# Patient Record
Sex: MALE
[De-identification: names, ages, dates, MRNs, and addresses within clinical notes are randomized per-mention and may not be internally consistent; named-entity substitution may affect disease eponyms.]

---

## 2024-08-17 ENCOUNTER — NON-APPOINTMENT (OUTPATIENT)
Age: 54
End: 2024-08-17

## 2024-11-30 ENCOUNTER — EMERGENCY (EMERGENCY)
Facility: HOSPITAL | Age: 54
LOS: 1 days | Discharge: HOME | End: 2024-11-30
Attending: STUDENT IN AN ORGANIZED HEALTH CARE EDUCATION/TRAINING PROGRAM | Admitting: STUDENT IN AN ORGANIZED HEALTH CARE EDUCATION/TRAINING PROGRAM
Payer: OTHER GOVERNMENT

## 2024-11-30 ENCOUNTER — EMERGENCY (EMERGENCY)
Facility: HOSPITAL | Age: 54
LOS: 0 days | Discharge: LEFT BEFORE DISCHARGE | End: 2024-11-30
Attending: STUDENT IN AN ORGANIZED HEALTH CARE EDUCATION/TRAINING PROGRAM
Payer: OTHER GOVERNMENT

## 2024-11-30 VITALS
HEIGHT: 73 IN | TEMPERATURE: 99 F | SYSTOLIC BLOOD PRESSURE: 131 MMHG | WEIGHT: 195.99 LBS | DIASTOLIC BLOOD PRESSURE: 78 MMHG | RESPIRATION RATE: 16 BRPM | HEART RATE: 66 BPM | OXYGEN SATURATION: 97 %

## 2024-11-30 VITALS
RESPIRATION RATE: 18 BRPM | HEART RATE: 62 BPM | DIASTOLIC BLOOD PRESSURE: 91 MMHG | SYSTOLIC BLOOD PRESSURE: 152 MMHG | TEMPERATURE: 98 F | OXYGEN SATURATION: 99 %

## 2024-11-30 VITALS
SYSTOLIC BLOOD PRESSURE: 137 MMHG | DIASTOLIC BLOOD PRESSURE: 79 MMHG | RESPIRATION RATE: 17 BRPM | TEMPERATURE: 98 F | HEART RATE: 64 BPM | OXYGEN SATURATION: 97 %

## 2024-11-30 VITALS
TEMPERATURE: 98 F | SYSTOLIC BLOOD PRESSURE: 147 MMHG | HEIGHT: 73 IN | WEIGHT: 197.98 LBS | HEART RATE: 75 BPM | RESPIRATION RATE: 20 BRPM | OXYGEN SATURATION: 97 % | DIASTOLIC BLOOD PRESSURE: 92 MMHG

## 2024-11-30 DIAGNOSIS — H53.8 OTHER VISUAL DISTURBANCES: ICD-10-CM

## 2024-11-30 DIAGNOSIS — I10 ESSENTIAL (PRIMARY) HYPERTENSION: ICD-10-CM

## 2024-11-30 DIAGNOSIS — Z53.29 PROCEDURE AND TREATMENT NOT CARRIED OUT BECAUSE OF PATIENT'S DECISION FOR OTHER REASONS: ICD-10-CM

## 2024-11-30 LAB
A1C WITH ESTIMATED AVERAGE GLUCOSE RESULT: 4.5 % — SIGNIFICANT CHANGE UP (ref 4–5.6)
APTT BLD: 33.2 SEC — SIGNIFICANT CHANGE UP (ref 24.5–35.6)
AT III ACT/NOR PPP CHRO: 106 % — SIGNIFICANT CHANGE UP (ref 85–135)
CHOLEST SERPL-MCNC: 255 MG/DL — HIGH
ESTIMATED AVERAGE GLUCOSE: 82 — SIGNIFICANT CHANGE UP
HDLC SERPL-MCNC: 52 MG/DL — SIGNIFICANT CHANGE UP
INR BLD: 0.91 RATIO — SIGNIFICANT CHANGE UP (ref 0.85–1.16)
LIPID PNL WITH DIRECT LDL SERPL: 175 MG/DL — HIGH
NON HDL CHOLESTEROL: 203 MG/DL — HIGH
PROT SERPL-MCNC: 6.9 G/DL — SIGNIFICANT CHANGE UP (ref 6–8.3)
PROTHROM AB SERPL-ACNC: 10.8 SEC — SIGNIFICANT CHANGE UP (ref 9.9–13.4)
TRIGL SERPL-MCNC: 151 MG/DL — HIGH

## 2024-11-30 PROCEDURE — 84165 PROTEIN E-PHORESIS SERUM: CPT | Mod: 26

## 2024-11-30 PROCEDURE — 99285 EMERGENCY DEPT VISIT HI MDM: CPT

## 2024-11-30 PROCEDURE — G0452: CPT | Mod: 26

## 2024-11-30 PROCEDURE — L9995: CPT

## 2024-11-30 PROCEDURE — 71046 X-RAY EXAM CHEST 2 VIEWS: CPT | Mod: 26

## 2024-11-30 NOTE — ED PROVIDER NOTE - NSFOLLOWUPINSTRUCTIONS_ED_ALL_ED_FT
Symptoms    Mild CRVO may show no symptoms. However:    Many patients with CRVO have symptoms such as blurry or distorted vision due to swelling of the center part of the retina, known as the macula.  Some patients have mild symptoms that wax and wane, called transient visual obscurations.  Patients with severe CRVO and secondary complications such as glaucoma (a disease characterized by increased pressure in the eye) often have pain, redness, irritation and other problems.  Causes  Most patients with CRVO develop it in one eye. And, although diabetes and high blood pressure are risk factors for CRVO, its specific cause is still unknown. What we do know is that CRVO develops from a blood clot or reduced blood flow in the central retinal vein that drains the retina. And we have learned that a large number of conditions may increase the risk of blood clots. Some eye doctors advise testing for them. However, it is not certain how these health conditions are related to CRVO—and some of them, if diagnosed, have no agreed-to or necessary recommended treatment.    Many eye doctors do not advise testing for a CRVO in one eye, but do recommend a visit with a family doctor to be sure there is no diabetes or high blood pressure.    CRVO that occurs in both eyes at the same time can be related to systemic disease; in these cases, a tendency toward abnormal blood clotting is definitely more common and medical testing to detect so-called “hypercoagulable states” is indicated. While some eye doctors coordinate such testing, most refer patients to their family doctors, internists, or hematologists (physicians specializing in diseases of the blood) for testing.    Diagnostic testing  CRVO is typically a clinical diagnosis—that is, one based on medical signs and patientreported symptoms. When a retina specialist looks into the eye, there is a characteristic pattern of retinal hemorrhages (bleeding) and a diagnosis is made (Figure 1).    Common conditions that can take on an appearance of CRVO include diabetic retinopathy (retina disease) and retinopathy related to low blood counts, such as anemia and thrombocytopenia (a deficiency of blood platelets).    Enlarge  Figure 1. CRVO with Flame Hemorrhages Alberto Wyatt MD. Retina Image Bank 2012; Image 968. ©American Society of Retina Specialists.    Enlarge  Figure 2. OCT of an acute CRVO with severe macular edema. Image courtesy of Sterling Oviedo MD    Swelling of the center of the retina, called macular edema is common, and to detect this and measure the amount of swelling, an optical coherence tomography (OCT) image is often obtained (Figure 2). To help distinguish CRVO from conditions that may mimic it, and to assess closure of small blood vessels, or to search for or confirm growth of new abnormal vessels, fluorescein angiography (FA) imaging may be performed.    Treatment and prognosis  CRVO has a better prognosis in young people. In older patients who receive no treatment, about one-third improve on their own, about one-third wax and wane and stay about the same, and about one-third get worse. If there is macular edema, it may improve on its own.    In patients with CRVO, vascular endothelial growth factor (VEGF) is elevated; this leads to swelling as well as new vessels that are prone to bleeding. The most common treatment, based on results from powerful randomized clinical trials, involves periodic injections into the eye of an anti-VEGF drug to reduce the new blood vessel growth and swelling. Anti-VEGF drugs include bevacizumab (Avastin®), ranibizumab (Lucentis®), and aflibercept (Eylea®).    Although anti-VEGF drugs reduce the swelling, they are not a cure. As the drug leaves the eye and moves into the bloodstream, the effect in the eye wears off, so re-injection is often needed. A rare grady patient needs only one injection, but the norm is a series of periodic injections over the course of a few years.     Another option for treating macular edema from CRVO is with an injection of intraocular steroid. This could be either a liquid steroid called triamcinolone or a small steroid pellet called dexamathasone implant (Ozurdex(R)). The steroid injections typically last several months, but can cause elevated intraocular pressure requiring eye drops or increased rate of cataract formation.    Ischemic (pronounced is KEY robert) and Non-ischemic CRVO: CRVO comes in 2 types:    Non-ischemic CRVO—a milder type characterized by leaky retinal vessels with macular edema  Ischemic CRVO—a more severe type with closed-off small retinal blood vessels  Patients with ischemic CRVO have worse vision with less chance for improvement. They have a tendency for the eye to cause new blood vessels to grow—and in the front of the eye, these new vessels can clog the outflow of normal eye fluids. The eye pressure goes up and glaucoma develops. In the back of the eye, new blood vessels may cause bleeding.    When there is ischemic CRVO with new vessels, anti-VEGF injections lead to prompt, but often temporary, control of the new vessels. Laser treatment tends to offer a more permanent effect. In some cases, both treatments are used.    Non-ischemic CRVO can worsen and become ischemic, so when CRVO is diagnosed, monthly checkups are initially recommended.    It’s important to note that early detection of macular edema or abnormal blood vessels is important; most patients can avoid severe vision loss if treatment is begun before substantial damage develops in the eye.

## 2024-11-30 NOTE — ED ADULT NURSE NOTE - NSFALLUNIVINTERV_ED_ALL_ED
Bed/Stretcher in lowest position, wheels locked, appropriate side rails in place/Call bell, personal items and telephone in reach/Instruct patient to call for assistance before getting out of bed/chair/stretcher/Non-slip footwear applied when patient is off stretcher/[place] to call system/Physically safe environment - no spills, clutter or unnecessary equipment/Purposeful proactive rounding/Room/bathroom lighting operational, light cord in reach

## 2024-11-30 NOTE — ED ADULT NURSE NOTE - OBJECTIVE STATEMENT
Pt received to intake 10A. Pt is a 54-year-old male, denies past medical history. Pt c/o right eye blurry vision x 2 days.  Pt was seen at [facility] and advised to be seen here for Optho consult. endorses mild headache. denies chest pain, SOB,  dizziness, abdominal pain, n/v, numbness/tingling.   Pt is A&Ox4, ambulatory without assistance. airway patent, speaking clearly in full sentences. breathing is even and unlabored. +PERRLA. no redness noted to eye. spontaneous movement of all extremities. comfort measures provided. stretcher set in lowest position, call bell within reach, safety maintained.

## 2024-11-30 NOTE — ED ADULT NURSE NOTE - CHIEF COMPLAINT QUOTE
pt was to be transferred from [place] for opthalmology eval, pt left against medical advice and had family drive him to [facility]. pt c/o mild right eye pain x 2 days, pt states he lifted weights yesterday.

## 2024-11-30 NOTE — ED PROVIDER NOTE - CLINICAL SUMMARY MEDICAL DECISION MAKING FREE TEXT BOX
54-year-old male no past medical history is presenting to the emergency department with right-sided eye blurry vision.  Has been going on for several days.  Went to [place] earlier today where his pressures were found to be 80 with a Luis-Pen.  Patient with advised to be transferred to [facility] ED for Optho evaluation.  But patient left on his own recognizance and presented to the [facility] emergency department.  Here his vital signs are reviewed and are within normal limits.  He has extraocular muscles that are intact he is ambulatory doubt ataxia 5 out of 5 in all extremities.  Pupils are equal round reactive to light appropriate for light level in room.  He is 20/50 0D and 20/20 OS.  iCare device was utilized to measure pressures and they were 39 and each eye.  Ophthalmology consulted for possible glaucoma versus retinal disease, will likely need dilated eye exam.  Will reassess patient after results of specialist consultation. 54-year-old male no past medical history is presenting to the emergency department with right-sided eye blurry vision.  Has been going on for several days.  Went to [place] earlier today where his pressures were found to be 80 with a Luis-Pen.  Patient with advised to be transferred to [facility] ED for Optho evaluation.  But patient left on his own recognizance and presented to the [facility] emergency department.  Here his vital signs are reviewed and are within normal limits.  He has extraocular muscles that are intact he is ambulatory doubt ataxia 5 out of 5 in all extremities.  Pupils are equal round reactive to light appropriate for light level in room.  He is 20/50 0D and 20/20 OS.  iCare device was utilized to measure pressures and they were 39 and each eye.  Ophthalmology consulted for possible glaucoma versus retinal disease, will likely need dilated eye exam.  Will reassess patient after results of specialist consultation.    ===================================  update (Gallo Dowd MD; attending emergency medicine and medical toxicology)    Patient found to have CRVO consented to genetic screening, patient will be signed out to oncoming attending pending basic screening labs and chest x-ray.  You look like you are from ENT 54-year-old male no past medical history is presenting to the emergency department with right-sided eye blurry vision.  Has been going on for several days.  Went to [place] earlier today where his pressures were found to be 80 with a Luis-Pen.  Patient with advised to be transferred to [facility] ED for Optho evaluation.  But patient left on his own recognizance and presented to the [facility] emergency department.  Here his vital signs are reviewed and are within normal limits.  He has extraocular muscles that are intact he is ambulatory doubt ataxia 5 out of 5 in all extremities.  Pupils are equal round reactive to light appropriate for light level in room.  He is 20/50 0D and 20/20 OS.  iCare device was utilized to measure pressures and they were 39 and each eye.  Ophthalmology consulted for possible glaucoma versus retinal disease, will likely need dilated eye exam.  Will reassess patient after results of specialist consultation.    ===================================  update (Gallo Dowd MD; attending emergency medicine and medical toxicology)    Patient found to have CRVO consented to genetic screening, patient will be signed out to oncoming attending pending basic screening labs and chest x-ray.  You look like you are from ENT    Labs ordered as per ophthalmology request he will follow-up with retina specialist Monday.  Chest x-ray clear as per my interpretation.  Return precautions reviewed

## 2024-11-30 NOTE — CONSULT NOTE ADULT - SUBJECTIVE AND OBJECTIVE BOX
[facility] DEPARTMENT OF OPHTHALMOLOGY - INITIAL ADULT CONSULT  -----------------------------------------------------------------------------  Loida Ken MD PGY-2   Contact: TEAMS  -----------------------------------------------------------------------------    HPI: 54-year-old male no past medical history is presenting to the emergency department with right-sided eye blurry vision.  Has been going on for several days.  Went to [place] earlier today where his pressures were found to be 80 with a Luis-Pen.  Patient with advised to be transferred to [facility] ED for Optho evaluation.  But patient left on his own recognizance and presented to the [facility] emergency department.  Here his vital signs are reviewed and are within normal limits.  He has extraocular muscles that are intact he is ambulatory doubt ataxia 5 out of 5 in all extremities.  Pupils are equal round reactive to light appropriate for light level in room.  He is 20/50 0D and 20/20 OS.  iCare device was utilized to measure pressures and they were 39 and each eye.  Ophthalmology consulted for possible glaucoma versus retinal disease, will likely need dilated eye exam.  Will reassess patient after results of specialist consultation.    Interval History: pt reports that vision became blurry 2 days ago. painless. full field of vision is blurry. 1 week of headache. CT scan normal 1 week ago.     PMH: htn, sciatica, bulging disc, g6pd deficiency, sickle cell trait, blood clot in right leg, recently on eliquis stopped 1 week ago   POcHx: denies surg/laser  FH: glaucoma in grandmother  Social History: denies etoh/tobacco  Ophthalmic Medications: artificial tears   Allergies: NKDA    Review of Systems:  Constitutional: No fever, chills  Eyes: + blurry vision, (-) flashes, floaters, FBS, erythema, discharge, double vision, OU  Neuro: No tremors  Cardiovascular: No chest pain, palpitations  Respiratory: No SOB, no cough  GI: No nausea, vomiting, abdominal pain  : No dysuria  Skin: no rash  Psych: no depression  Endocrine: no polyuria, polydipsia  Heme/lymph: no swelling    VITALS: T(C): 37.1 (11-30-24 @ 16:37)  T(F): 98.7 (11-30-24 @ 16:37), Max: 98.7 (11-30-24 @ 16:37)  HR: 66 (11-30-24 @ 16:37) (62 - 75)  BP: 131/78 (11-30-24 @ 16:37) (131/78 - 152/91)  RR:  (16 - 20)  SpO2:  (97% - 99%)  Wt(kg): --  General: AAO x 3, appropriate mood and affect    Ophthalmology Exam:  Visual acuity (sc): 20/100 PHNI OD, 20/100 PH 20/20 OS  Pupils: PERRL OU, no RAPD  Ttono: 20 OD 20 OS  Extraocular movements (EOMs): Full OU, no pain, no diplopia  Confrontational Visual Field (CVF): Full OD, Full OS  Color Plates: 12/12 OD, 12/12 OS    Pen Light Exam (PLE)  External: Flat OU  Lids/Lashes/Lacrimal Ducts: Flat OU    Sclera/Conjunctiva: W+Q OU  Cornea: Cl OU  Anterior Chamber: D+F OU    Iris: Flat OU  Lens: Cl OU    Fundus Exam: dilated with 1% tropicamide and 2.5% phenylephrine  Approval obtained from primary team for dilation  Patient aware that pupils can remained dilated for at least 4-6 hours  Exam performed with 20D lens    Vitreous: wnl OU  Disc, cup/disc: sharp and pink, 0.4 OU  Macula: Flat OU  Vessels: tortuous, diffuse retinal hemorrhages all 4 quadrants OU  Periphery: flat OU    Labs/Imaging:  *** [facility] DEPARTMENT OF OPHTHALMOLOGY - INITIAL ADULT CONSULT  -----------------------------------------------------------------------------  Loida Ken MD PGY-2   Contact: TEAMS  -----------------------------------------------------------------------------    HPI: 54-year-old male no past medical history is presenting to the emergency department with right-sided eye blurry vision.  Has been going on for several days.  Went to [place] earlier today where his pressures were found to be 80 with a Luis-Pen.  Patient with advised to be transferred to [facility] ED for Optho evaluation.  But patient left on his own recognizance and presented to the [facility] emergency department.  Here his vital signs are reviewed and are within normal limits.  He has extraocular muscles that are intact he is ambulatory doubt ataxia 5 out of 5 in all extremities.  Pupils are equal round reactive to light appropriate for light level in room.  He is 20/50 0D and 20/20 OS.  iCare device was utilized to measure pressures and they were 39 and each eye.  Ophthalmology consulted for possible glaucoma versus retinal disease, will likely need dilated eye exam.  Will reassess patient after results of specialist consultation.    Interval History: pt reports that vision became blurry 2 days ago. painless. full field of vision is blurry. 1 week of headache. CT scan normal 1 week ago.     PMH: htn, sciatica, bulging disc, g6pd deficiency, sickle cell trait, blood clot in right leg, recently on eliquis stopped 1 week ago   POcHx: denies surg/laser  FH: glaucoma in grandmother  Social History: denies etoh/tobacco  Ophthalmic Medications: artificial tears   Allergies: NKDA    Review of Systems:  Constitutional: No fever, chills  Eyes: + blurry vision, (-) flashes, floaters, FBS, erythema, discharge, double vision, OU  Neuro: No tremors  Cardiovascular: No chest pain, palpitations  Respiratory: No SOB, no cough  GI: No nausea, vomiting, abdominal pain  : No dysuria  Skin: no rash  Psych: no depression  Endocrine: no polyuria, polydipsia  Heme/lymph: no swelling    VITALS: T(C): 37.1 (11-30-24 @ 16:37)  T(F): 98.7 (11-30-24 @ 16:37), Max: 98.7 (11-30-24 @ 16:37)  HR: 66 (11-30-24 @ 16:37) (62 - 75)  BP: 131/78 (11-30-24 @ 16:37) (131/78 - 152/91)  RR:  (16 - 20)  SpO2:  (97% - 99%)  Wt(kg): --  General: AAO x 3, appropriate mood and affect    Ophthalmology Exam:  Visual acuity (sc): 20/100 PHNI OD, 20/100 PH 20/20 OS  Pupils: PERRL OU, no RAPD  Ttono: 20 OD 20 OS  Extraocular movements (EOMs): Full OU, no pain, no diplopia  Confrontational Visual Field (CVF): Full OD, Full OS  Color Plates: 12/12 OD, 12/12 OS    Pen Light Exam (PLE)  External: Flat OU  Lids/Lashes/Lacrimal Ducts: Flat OU    Sclera/Conjunctiva: W+Q OU  Cornea: Cl OU  Anterior Chamber: D+F OU    Iris: Flat OU  Lens: Cl OU    Fundus Exam: dilated with 1% tropicamide and 2.5% phenylephrine  Approval obtained from primary team for dilation  Patient aware that pupils can remained dilated for at least 4-6 hours  Exam performed with 20D lens    Vitreous: wnl OU  Disc, cup/disc: hemorrhages at disc margin 0.6 OD, sharp and pink, 0.6 OS  Macula: Flat OU  Vessels: tortuous, diffuse retinal hemorrhages all 4 quadrants OU  Periphery: flat OU    Labs/Imaging:  ***

## 2024-11-30 NOTE — ED ADULT NURSE REASSESSMENT NOTE - NS ED NURSE REASSESS COMMENT FT1
20G placed in RAC; labs drawn and sent. Molecular genetic consent sent down with labs; MD Lopez aware. Patient in no signs of acute distress. Respirations even and unlabored, chest rise symmetrical b/l. Comfort measures maintained. Bed in lowest position. Family at bedside. Safety maintained.

## 2024-11-30 NOTE — ED PROVIDER NOTE - PATIENT PORTAL LINK FT
You can access the FollowMyHealth Patient Portal offered by [facility] by registering at the following website: [email or web address]. By joining [employer]’s FollowMyHealth portal, you will also be able to view your health information using other applications (apps) compatible with our system.

## 2024-11-30 NOTE — CONSULT NOTE ADULT - ASSESSMENT
Assessment and Recommendations:  54y male with a past medical history/ocular history of  consulted for htn, sciatica, bulging disc, g6pd deficiency, sickle cell trait, blood clot in right leg, recently on eliquis stopped 1 week ago, found to have CRVO     #CRVO OD   -    Seen and discussed with ***.    Outpatient Follow-up: Patient should follow-up with his/her ophthalmologist or with [facility] Department of Ophthalmology within 1 week of after discharge at:    [address]  [place] [address]  [phone]    Loida Ken MD, PGY-2  Contact: [employer] Teams     Assessment and Recommendations:  54y male with a past medical history/ocular history of  consulted for htn, sciatica, bulging disc, g6pd deficiency, sickle cell trait, blood clot in right leg, recently on eliquis stopped 1 week ago, found to have CRVO     #CRVO OD   - BCVA 20/100 PHNI, EOM, colors, cvf full, perrla no apd, iop wnl   - 2 day hx of blurry vision OD sudden onset painless  - medical hx significant for htn, sciatica, bulging disc, g6pd deficiency, sickle cell trait, blood clot in right leg, recently on eliquis stopped 1 week ago.   - anterior exam unremarkable  - DFE with CRVO OD, no NVD   - recommend: fasting blood sugar, and hemoglobin A1c, CBC with differential, platelets,  PT/PTT/ lipid profile, Hb electrophoresis, RPR, FT-ABS, ALMA ROSA, cryoglobulins, antiphospholipid antibodies, factor V leiden mutation, protein C and S levels, antithrombin III mutation, prothrombin mutation, homocysteine levels, SPEP and CXR  - Patient recommended to follow up with retina specialist as soon as possible, resident will set up appt for patient on Monday at [facility] [address]     Outpatient Follow-up: Patient should follow-up with his/her ophthalmologist or with [facility] Department of Ophthalmology within 1 week of after discharge at:    [facility]   [address]  [place] [address]      Loida Ken MD, PGY-2  Contact: [employer] Teams     Assessment and Recommendations:  54y male with a past medical history/ocular history of  consulted for htn, sciatica, bulging disc, g6pd deficiency, sickle cell trait, blood clot in right leg, recently on eliquis stopped 1 week ago, found to have CRVO     #CRVO OD   - BCVA 20/100 PHNI, EOM, colors, cvf full, perrla no apd, iop wnl   - 2 day hx of blurry vision OD sudden onset painless  - medical hx significant for htn, sciatica, bulging disc, g6pd deficiency, sickle cell trait, blood clot in right leg, recently on eliquis stopped 1 week ago.   - anterior exam unremarkable  - DFE with CRVO OD, no NVD   - recommend: hemoglobin A1c, CBC with differential, platelets,  PT/PTT/ lipid profile, Hb electrophoresis, RPR, FT-ABS, ALMA ROSA, cryoglobulins, antiphospholipid antibodies, factor V leiden mutation, protein C and S levels, antithrombin III mutation, prothrombin mutation, homocysteine levels, SPEP and CXR  - Patient recommended to follow up with retina specialist as soon as possible, resident will set up appt for patient on Monday at [facility] [address]     Outpatient Follow-up: Patient should follow-up with his/her ophthalmologist or with [facility] Department of Ophthalmology within 1 week of after discharge at:    [facility]   [address] [place] [address]. [address]  [place] [address]      Loida Ken MD, PGY-2  Contact: [employer] Teams

## 2024-11-30 NOTE — ED ADULT TRIAGE NOTE - CHIEF COMPLAINT QUOTE
pt was to be transferred from [place] for opthalmology eval, pt left against medical advice and had family drive him to [facility]. pt c/o mild right eye pain x 2 days, pt states he lifted weights yesterday.

## 2024-12-01 PROBLEM — I10 ESSENTIAL (PRIMARY) HYPERTENSION: Chronic | Status: ACTIVE | Noted: 2024-11-30

## 2024-12-01 LAB — HCYS SERPL-MCNC: 8.3 UMOL/L — SIGNIFICANT CHANGE UP

## 2024-12-02 LAB
HEMOGLOBIN INTERPRETATION: SIGNIFICANT CHANGE UP
HGB A MFR BLD: 56.2 % — LOW (ref 95–97.6)
HGB A2 MFR BLD: 2.8 % — SIGNIFICANT CHANGE UP (ref 2.4–3.5)
HGB C MFR BLD: 40.7 % — HIGH
HGB F MFR BLD: <1 % — SIGNIFICANT CHANGE UP (ref 0–1.5)
HGB S BLD QL: NEGATIVE — SIGNIFICANT CHANGE UP
PROT C ACT/NOR PPP: 118 % — SIGNIFICANT CHANGE UP (ref 74–150)
SOLUBILITY: NEGATIVE — SIGNIFICANT CHANGE UP
T PALLIDUM AB TITR SER: NEGATIVE — SIGNIFICANT CHANGE UP

## 2024-12-03 LAB
ANA TITR SER: NEGATIVE — SIGNIFICANT CHANGE UP
DNA PLOIDY SPEC FC-IMP: SIGNIFICANT CHANGE UP
PROT S FREE PPP-ACNC: 72 % — SIGNIFICANT CHANGE UP (ref 70–130)
PTR INTERPRETATION: SIGNIFICANT CHANGE UP

## 2024-12-04 LAB
% ALBUMIN: 53.2 % — SIGNIFICANT CHANGE UP
% ALPHA 1: 3.1 % — SIGNIFICANT CHANGE UP
% ALPHA 2: 14.1 % — SIGNIFICANT CHANGE UP
% BETA: 14 % — SIGNIFICANT CHANGE UP
% GAMMA: 15.7 % — SIGNIFICANT CHANGE UP
ALBUMIN SERPL ELPH-MCNC: 3.67 G/DL — SIGNIFICANT CHANGE UP (ref 3.3–4.4)
ALBUMIN/GLOB SERPL ELPH: 1.1 RATIO — SIGNIFICANT CHANGE UP
ALPHA1 GLOB SERPL ELPH-MCNC: 0.21 G/DL — SIGNIFICANT CHANGE UP (ref 0.1–0.3)
ALPHA2 GLOB SERPL ELPH-MCNC: 1 G/DL — SIGNIFICANT CHANGE UP (ref 0.6–1)
B-GLOBULIN SERPL ELPH-MCNC: 0.97 G/DL — SIGNIFICANT CHANGE UP (ref 0.6–1.1)
CRYOGLOB SERPL-MCNC: NEGATIVE — SIGNIFICANT CHANGE UP
GAMMA GLOBULIN: 1.08 G/DL — SIGNIFICANT CHANGE UP (ref 0.7–1.7)
PROT PATTERN SERPL ELPH-IMP: SIGNIFICANT CHANGE UP
PROT SERPL-MCNC: 6.9 G/DL — SIGNIFICANT CHANGE UP (ref 6–8.3)

## 2025-02-22 ENCOUNTER — NEW PATIENT (OUTPATIENT)
Dept: URBAN - METROPOLITAN AREA CLINIC 67 | Facility: CLINIC | Age: 55
End: 2025-02-22

## 2025-02-22 ASSESSMENT — TONOMETRY
OD_IOP_MMHG: 21
OS_IOP_MMHG: 21

## 2025-02-22 ASSESSMENT — VISUAL ACUITY
OS_SC: 20/20
OD_SC: 20/20-1

## 2025-02-27 ENCOUNTER — FOLLOW UP (OUTPATIENT)
Dept: URBAN - METROPOLITAN AREA CLINIC 67 | Facility: CLINIC | Age: 55
End: 2025-02-27

## 2025-02-27 DIAGNOSIS — H43.823: ICD-10-CM

## 2025-02-27 DIAGNOSIS — H34.8112: ICD-10-CM

## 2025-02-27 DIAGNOSIS — H40.013: ICD-10-CM

## 2025-02-27 PROCEDURE — 92014 COMPRE OPH EXAM EST PT 1/>: CPT

## 2025-02-27 PROCEDURE — 92202 OPSCPY EXTND ON/MAC DRAW: CPT

## 2025-02-27 PROCEDURE — 92134 CPTRZ OPH DX IMG PST SGM RTA: CPT

## 2025-02-27 ASSESSMENT — TONOMETRY
OS_IOP_MMHG: 17
OD_IOP_MMHG: 14

## 2025-02-27 ASSESSMENT — VISUAL ACUITY
OD_SC: 20/25-2
OS_SC: 20/20